# Patient Record
Sex: FEMALE | URBAN - METROPOLITAN AREA
[De-identification: names, ages, dates, MRNs, and addresses within clinical notes are randomized per-mention and may not be internally consistent; named-entity substitution may affect disease eponyms.]

---

## 2023-06-14 ENCOUNTER — APPOINTMENT (RX ONLY)
Dept: URBAN - METROPOLITAN AREA CLINIC 80 | Facility: CLINIC | Age: 53
Setting detail: DERMATOLOGY
End: 2023-06-14

## 2023-06-14 DIAGNOSIS — Z41.9 ENCOUNTER FOR PROCEDURE FOR PURPOSES OTHER THAN REMEDYING HEALTH STATE, UNSPECIFIED: ICD-10-CM

## 2023-06-14 PROCEDURE — ? BOTOX

## 2023-06-14 NOTE — PROCEDURE: BOTOX
[place] Units: 0
Consent: Written consent obtained. Risks include but not limited to lid/brow ptosis, bruising, swelling, diplopia, temporary effect, incomplete chemical denervation.
Show Depressor Anguli Units: Yes
Detail Level: Zone
Additional Area 2 Units: [address]
Show Right And Left Periorbital Units: No
Additional Area 1 Location: chin
Forehead Units: [address] [facility], [place]
Additional Area 1 Units: 4
Glabellar Complex Units: 16
Incrementing Botox Units: By 0.5 Units
Post-Care Instructions: Patient instructed to not lie down for 4 hours and limit physical activity for 24 hours. Patient instructed not to travel by airplane for 48 hours.
Show Inventory Tab: Hide
Additional Area 2 Location: neck
Periorbital Skin Units: [address] [facility]
Lot #: 300 [facility]
Dilution (U/0.1 Cc): 3

## 2023-06-14 NOTE — PROCEDURE: MIPS QUALITY
Detail Level: Generalized
Quality 226: Preventive Care And Screening: Tobacco Use: Screening And Cessation Intervention: Patient screened for tobacco use, is a smoker AND received Cessation Counseling within measurement period or in the six months prior to the measurement period
Quality 431: Preventive Care And Screening: Unhealthy Alcohol Use - Screening: Patient not identified as an unhealthy alcohol user when screened for unhealthy alcohol use using a systematic screening method